# Patient Record
Sex: MALE | Race: BLACK OR AFRICAN AMERICAN
[De-identification: names, ages, dates, MRNs, and addresses within clinical notes are randomized per-mention and may not be internally consistent; named-entity substitution may affect disease eponyms.]

---

## 2009-02-09 VITALS — SYSTOLIC BLOOD PRESSURE: 162.6 MMHG

## 2017-01-26 ENCOUNTER — HOSPITAL ENCOUNTER (OUTPATIENT)
Dept: HOSPITAL 19 - COL.PUL | Age: 82
End: 2017-01-26
Attending: PHYSICIAN ASSISTANT
Payer: MEDICARE

## 2017-01-26 DIAGNOSIS — R06.02: Primary | ICD-10-CM

## 2017-01-26 LAB
ARTERIAL PATENCY WRIST A: (no result)
ARTERIAL PATENCY WRIST A: YES
ATS?: YES
BASE EXCESS BLDA CALC-SCNC: -0.7 MMOL/L (ref -2–2)
CO2 BLDA-SCNC: 23.4 MMOL/L
HCO3 BLDA-SCNC: 22.4 MEQ/L (ref 22–26)
INHALED O2 CONCENTRATION: 21 %
OXYHGB MFR BLD: 91.2 %
PH BLDA: 7.46 C (ref 7.35–7.45)
PO2 BLDA: 65.6 MMHG (ref 80–100)
PO2 BLDA: 65.6 MM[HG] (ref 80–100)
SAO2 % BLDA: 92.4 % (ref 92–100)

## 2017-02-22 ENCOUNTER — HOSPITAL ENCOUNTER (OUTPATIENT)
Dept: HOSPITAL 19 - COL.LAB | Age: 82
End: 2017-02-22
Attending: INTERNAL MEDICINE
Payer: MEDICARE

## 2017-02-22 DIAGNOSIS — I72.8: Primary | ICD-10-CM

## 2017-02-22 LAB
ANION GAP SERPL CALC-SCNC: 10 MMOL/L (ref 7–16)
BASOPHILS # BLD: 0 10*3/UL (ref 0–0.2)
BASOPHILS NFR BLD AUTO: 0.4 % (ref 0–2)
BUN SERPL-MCNC: 33 MG/DL (ref 9–20)
CALCIUM SERPL-MCNC: 10.2 MG/DL (ref 8.4–10.2)
CHLORIDE SERPL-SCNC: 107 MMOL/L (ref 98–107)
CO2 SERPL-SCNC: 26 MMOL/L (ref 22–30)
CREAT SERPL-SCNC: 1.93 MG/DL (ref 0.66–1.25)
EOSINOPHIL # BLD: 0.1 10*3/UL (ref 0–0.7)
EOSINOPHIL NFR BLD: 2.4 % (ref 0–4)
ERYTHROCYTE [DISTWIDTH] IN BLOOD BY AUTOMATED COUNT: 13.8 % (ref 11.5–14.5)
GLUCOSE SERPL-MCNC: 92 MG/DL (ref 74–106)
GRANULOCYTES # BLD AUTO: 64.1 % (ref 42.2–75.2)
HCT VFR BLD AUTO: 37.5 % (ref 42–52)
HGB BLD-MCNC: 11.8 G/DL (ref 13.5–18)
LYMPHOCYTES # BLD: 1.1 10*3/UL (ref 1.2–3.4)
LYMPHOCYTES NFR BLD: 21.4 % (ref 20–51)
MCH RBC QN AUTO: 32 PG (ref 27–31)
MCHC RBC AUTO-ENTMCNC: 32 G/DL (ref 33–37)
MCV RBC AUTO: 101 FL (ref 80–100)
MONOCYTES # BLD: 0.6 10*3/UL (ref 0.1–0.6)
MONOCYTES NFR BLD AUTO: 11.5 % (ref 1.7–9.3)
NEUTROPHILS # BLD: 3.2 10*3/UL (ref 1.4–6.5)
PLATELET # BLD AUTO: 142 K/MM3 (ref 130–400)
PMV BLD AUTO: 11 FL (ref 7.4–10.4)
POTASSIUM SERPL-SCNC: 5.1 MMOL/L (ref 3.4–5)
RBC # BLD AUTO: 3.71 M/MM3 (ref 4.2–5.6)
SODIUM SERPL-SCNC: 143 MMOL/L (ref 137–145)
WBC # BLD AUTO: 5 K/MM3 (ref 4.8–10.8)

## 2018-08-17 ENCOUNTER — HOSPITAL ENCOUNTER (OUTPATIENT)
Dept: HOSPITAL 19 - COL.VAS | Age: 83
End: 2018-08-17
Attending: INTERNAL MEDICINE
Payer: MEDICARE

## 2018-08-17 DIAGNOSIS — N17.9: ICD-10-CM

## 2018-08-17 DIAGNOSIS — N18.3: Primary | ICD-10-CM

## 2018-08-17 PROCEDURE — G0365 VESSEL MAPPING HEMO ACCESS: HCPCS

## 2019-12-26 ENCOUNTER — HOSPITAL ENCOUNTER (INPATIENT)
Dept: HOSPITAL 19 - MEDICAL | Age: 84
LOS: 4 days | Discharge: HOME | DRG: 682 | End: 2019-12-30
Attending: INTERNAL MEDICINE | Admitting: INTERNAL MEDICINE
Payer: MEDICARE

## 2019-12-26 VITALS — HEIGHT: 65.98 IN | WEIGHT: 116.62 LBS | BODY MASS INDEX: 18.74 KG/M2

## 2019-12-26 VITALS — TEMPERATURE: 97.5 F | DIASTOLIC BLOOD PRESSURE: 71 MMHG | HEART RATE: 74 BPM | SYSTOLIC BLOOD PRESSURE: 149 MMHG

## 2019-12-26 VITALS — DIASTOLIC BLOOD PRESSURE: 76 MMHG | HEART RATE: 81 BPM | SYSTOLIC BLOOD PRESSURE: 146 MMHG | TEMPERATURE: 98 F

## 2019-12-26 DIAGNOSIS — D63.1: ICD-10-CM

## 2019-12-26 DIAGNOSIS — I13.0: ICD-10-CM

## 2019-12-26 DIAGNOSIS — N18.4: ICD-10-CM

## 2019-12-26 DIAGNOSIS — Z87.891: ICD-10-CM

## 2019-12-26 DIAGNOSIS — I73.9: ICD-10-CM

## 2019-12-26 DIAGNOSIS — I50.9: ICD-10-CM

## 2019-12-26 DIAGNOSIS — Z95.0: ICD-10-CM

## 2019-12-26 DIAGNOSIS — N17.9: Primary | ICD-10-CM

## 2019-12-26 DIAGNOSIS — G89.29: ICD-10-CM

## 2019-12-26 DIAGNOSIS — E43: ICD-10-CM

## 2019-12-26 DIAGNOSIS — K52.9: ICD-10-CM

## 2019-12-26 DIAGNOSIS — Z87.01: ICD-10-CM

## 2019-12-26 DIAGNOSIS — Z79.82: ICD-10-CM

## 2019-12-26 DIAGNOSIS — Z79.891: ICD-10-CM

## 2019-12-26 DIAGNOSIS — Z87.440: ICD-10-CM

## 2019-12-26 DIAGNOSIS — F32.9: ICD-10-CM

## 2019-12-26 DIAGNOSIS — I27.20: ICD-10-CM

## 2019-12-26 LAB
HYALINE CASTS #/AREA URNS LPF: (no result) /LPF
PH UR STRIP.AUTO: 5 [PH] (ref 5–8)
RBC # UR: (no result) /HPF
SP GR UR STRIP.AUTO: 1.01 (ref 1–1.03)
SQUAMOUS # URNS: (no result) /HPF
UA DIPSTICK PNL UR STRIP.AUTO: (no result)
URN COLLECT METHOD CLASS: (no result)
WBC # UR: (no result) /HPF

## 2019-12-26 NOTE — NUR
Patient to room 352 by wheelchair. A&Ox3, reporting pain in legs. No pain
medication requested. Nursing staff oriented patient to room ,call light and
bed. VSS. Fistula right forearm. Wife at the bedside. No further needs
expressed from patient. Call light within reach. Will call Dr Melendez for
additional orders.

## 2019-12-26 NOTE — NUR
Patient resting in bed, wife at the bedside. A&Ox3, reporting pain in legs. No
pain medication requested. VSS. IV CDI, fluids infusing. PAtient complaints of
being cold, warm blankets provided when requested. No further needs expressed
from patient. Call light within reach

## 2019-12-27 VITALS — SYSTOLIC BLOOD PRESSURE: 125 MMHG | HEART RATE: 72 BPM | TEMPERATURE: 97.6 F | DIASTOLIC BLOOD PRESSURE: 61 MMHG

## 2019-12-27 VITALS — SYSTOLIC BLOOD PRESSURE: 108 MMHG | DIASTOLIC BLOOD PRESSURE: 62 MMHG | HEART RATE: 77 BPM | TEMPERATURE: 97.4 F

## 2019-12-27 VITALS — TEMPERATURE: 98.3 F | HEART RATE: 74 BPM | SYSTOLIC BLOOD PRESSURE: 116 MMHG | DIASTOLIC BLOOD PRESSURE: 63 MMHG

## 2019-12-27 VITALS — SYSTOLIC BLOOD PRESSURE: 117 MMHG | DIASTOLIC BLOOD PRESSURE: 59 MMHG | TEMPERATURE: 97.8 F | HEART RATE: 72 BPM

## 2019-12-27 VITALS — TEMPERATURE: 97.4 F | HEART RATE: 78 BPM | SYSTOLIC BLOOD PRESSURE: 121 MMHG | DIASTOLIC BLOOD PRESSURE: 54 MMHG

## 2019-12-27 VITALS — SYSTOLIC BLOOD PRESSURE: 122 MMHG | DIASTOLIC BLOOD PRESSURE: 60 MMHG | HEART RATE: 64 BPM | TEMPERATURE: 97.7 F

## 2019-12-27 VITALS — DIASTOLIC BLOOD PRESSURE: 60 MMHG | TEMPERATURE: 97.5 F | SYSTOLIC BLOOD PRESSURE: 126 MMHG | HEART RATE: 62 BPM

## 2019-12-27 LAB
ALBUMIN SERPL-MCNC: 3.2 GM/DL (ref 3.5–5)
ALP SERPL-CCNC: 48 U/L (ref 50–136)
ALT SERPL-CCNC: 14 U/L (ref 21–72)
ANION GAP SERPL CALC-SCNC: 9 MMOL/L (ref 7–16)
AST SERPL-CCNC: 13 U/L (ref 15–37)
BASOPHILS # BLD: 0 10*3/UL (ref 0–0.2)
BASOPHILS NFR BLD AUTO: 0.2 % (ref 0–2)
BILIRUB SERPL-MCNC: 0.4 MG/DL (ref 0–1)
BUN SERPL-MCNC: 112 MG/DL (ref 9–20)
CALCIUM SERPL-MCNC: 8.4 MG/DL (ref 8.4–10.2)
CHLORIDE SERPL-SCNC: 111 MMOL/L (ref 98–107)
CO2 SERPL-SCNC: 17 MMOL/L (ref 22–30)
CREAT SERPL-SCNC: 5.1 UMOL/L (ref 0.66–1.25)
EOSINOPHIL # BLD: 0.2 10*3/UL (ref 0–0.7)
EOSINOPHIL NFR BLD: 3.5 % (ref 0–4)
ERYTHROCYTE [DISTWIDTH] IN BLOOD BY AUTOMATED COUNT: 12.5 % (ref 11.5–14.5)
GLUCOSE SERPL-MCNC: 89 MG/DL (ref 74–106)
GRANULOCYTES # BLD AUTO: 72 % (ref 42.2–75.2)
HCT VFR BLD AUTO: 23.9 % (ref 42–52)
HGB BLD-MCNC: 7.2 G/DL (ref 13.5–18)
INR BLD: 1.2 (ref 0.8–3)
LYMPHOCYTES # BLD: 0.6 10*3/UL (ref 1.2–3.4)
LYMPHOCYTES NFR BLD: 12.6 % (ref 20–51)
MCH RBC QN AUTO: 32 PG (ref 27–31)
MCHC RBC AUTO-ENTMCNC: 30 G/DL (ref 33–37)
MCV RBC AUTO: 105 FL (ref 80–100)
MONOCYTES # BLD: 0.5 10*3/UL (ref 0.1–0.6)
MONOCYTES NFR BLD AUTO: 11.5 % (ref 1.7–9.3)
NEUTROPHILS # BLD: 3.3 10*3/UL (ref 1.4–6.5)
PLATELET # BLD AUTO: 65 K/MM3 (ref 130–400)
PMV BLD AUTO: 11.5 FL (ref 7.4–10.4)
POTASSIUM SERPL-SCNC: 5.3 MMOL/L (ref 3.4–5)
PROT SERPL-MCNC: 6.1 GM/DL (ref 6.4–8.2)
PROTHROMBIN TIME: 14 SECONDS (ref 9.7–12.8)
RBC # BLD AUTO: 2.28 M/MM3 (ref 4.2–5.6)
SODIUM SERPL-SCNC: 137 MMOL/L (ref 137–145)

## 2019-12-27 NOTE — NUR
QUITA met with the patient and his wife, Monico (ph#[phone]), to discuss
discharge plan. The patient lives in [place] with his wife. He reports
needing some assistance with bathing and has a cane and walker. He states that
his wife helps him with bathing. The patient's PCP is Dr. Noah Rodriguez and he
receives his medications at the  on [address]. He reports no difficulties
obtaining his meds. The patient does not have advanced directives in EMR, but
he states that he does have them completed. He states that his DPOA-HC is his
wife. The patient plans to return home with his wife upon discharge. No
additional needs at this time.

## 2019-12-27 NOTE — NUR
Pt awake and alert this morning, family in room, no C/O pain at this time,
shift assessments complete, left Pt call light in reCH, BED IN LOWEST
POSITION.

## 2019-12-27 NOTE — NUR
Pt resting in the room today, no C/O pain, sat up in the recliner for about 2
hours during the day, VS have remained stable.

## 2019-12-27 NOTE — NUR
Pt medication adminstered at this time. Delay was due to non verification of
medication X2. Clarification required for dosing required for both medication
which was obtained via pt and pts spouse. Following some time after adjustment
to medication rec, E pharmacy was informed. Verification in medication on EMAR
received. BP appropriate for dosing, medication administered.

## 2019-12-28 VITALS — SYSTOLIC BLOOD PRESSURE: 131 MMHG | TEMPERATURE: 98 F | DIASTOLIC BLOOD PRESSURE: 72 MMHG | HEART RATE: 68 BPM

## 2019-12-28 VITALS — DIASTOLIC BLOOD PRESSURE: 59 MMHG | TEMPERATURE: 98.2 F | HEART RATE: 70 BPM | SYSTOLIC BLOOD PRESSURE: 110 MMHG

## 2019-12-28 VITALS — TEMPERATURE: 98.4 F | HEART RATE: 69 BPM | SYSTOLIC BLOOD PRESSURE: 144 MMHG | DIASTOLIC BLOOD PRESSURE: 72 MMHG

## 2019-12-28 VITALS — HEART RATE: 53 BPM | DIASTOLIC BLOOD PRESSURE: 56 MMHG | TEMPERATURE: 98 F | SYSTOLIC BLOOD PRESSURE: 111 MMHG

## 2019-12-28 VITALS — SYSTOLIC BLOOD PRESSURE: 152 MMHG | TEMPERATURE: 97.9 F | DIASTOLIC BLOOD PRESSURE: 77 MMHG | HEART RATE: 69 BPM

## 2019-12-28 VITALS — DIASTOLIC BLOOD PRESSURE: 54 MMHG | TEMPERATURE: 97.7 F | HEART RATE: 66 BPM | SYSTOLIC BLOOD PRESSURE: 134 MMHG

## 2019-12-28 LAB
ALBUMIN SERPL-MCNC: 3.6 GM/DL (ref 3.5–5)
ANION GAP SERPL CALC-SCNC: 8 MMOL/L (ref 7–16)
BASOPHILS # BLD: 0 10*3/UL (ref 0–0.2)
BASOPHILS NFR BLD AUTO: 0.2 % (ref 0–2)
BUN SERPL-MCNC: 107 MG/DL (ref 9–20)
CALCIUM SERPL-MCNC: 9.4 MG/DL (ref 8.4–10.2)
CHLORIDE SERPL-SCNC: 112 MMOL/L (ref 98–107)
CO2 SERPL-SCNC: 22 MMOL/L (ref 22–30)
CREAT SERPL-SCNC: 4.67 UMOL/L (ref 0.66–1.25)
EOSINOPHIL # BLD: 0.2 10*3/UL (ref 0–0.7)
EOSINOPHIL NFR BLD: 3.9 % (ref 0–4)
ERYTHROCYTE [DISTWIDTH] IN BLOOD BY AUTOMATED COUNT: 12.6 % (ref 11.5–14.5)
GLUCOSE SERPL-MCNC: 100 MG/DL (ref 74–106)
GRANULOCYTES # BLD AUTO: 68.9 % (ref 42.2–75.2)
HCT VFR BLD AUTO: 25.8 % (ref 42–52)
HGB BLD-MCNC: 8.1 G/DL (ref 13.5–18)
LYMPHOCYTES # BLD: 0.6 10*3/UL (ref 1.2–3.4)
LYMPHOCYTES NFR BLD: 13.3 % (ref 20–51)
MCH RBC QN AUTO: 32 PG (ref 27–31)
MCHC RBC AUTO-ENTMCNC: 31 G/DL (ref 33–37)
MCV RBC AUTO: 103 FL (ref 80–100)
MONOCYTES # BLD: 0.6 10*3/UL (ref 0.1–0.6)
MONOCYTES NFR BLD AUTO: 12.6 % (ref 1.7–9.3)
NEUTROPHILS # BLD: 3.2 10*3/UL (ref 1.4–6.5)
PHOSPHATE SERPL-MCNC: 4.5 MG/DL (ref 2.5–4.5)
PLATELET # BLD AUTO: 76 K/MM3 (ref 130–400)
PMV BLD AUTO: 10.7 FL (ref 7.4–10.4)
POTASSIUM SERPL-SCNC: 5 MMOL/L (ref 3.4–5)
RBC # BLD AUTO: 2.5 M/MM3 (ref 4.2–5.6)
SODIUM SERPL-SCNC: 142 MMOL/L (ref 137–145)

## 2019-12-28 NOTE — NUR
Lying in bed with eyes closed. Respirations even and unlabored. No signs or
symptoms of discomfort noted at this time.

## 2019-12-28 NOTE — NUR
Patient voided 75mL dark clear yellow urine. Denies feeling like he still
needs to urinate or feeling like bladder is full. Bladder scan completed,
results with zero. Patient denies needs at this time.

## 2019-12-28 NOTE — NUR
Lying in bed on right side with eyes closed. Opens eyes when name called out.
Denies pain. Has had two loose bowel movements in the past hour and would like
medication to help stop the loose stools. Patient denies further needs at this
time.

## 2019-12-28 NOTE — NUR
PATIENT ASSESSMENT COMPLETED. HE REPORTS THAT HE HAS HAD NO MORE DIARRHEA
SINCE TAKING THE IMMODIUM. HE DENIES ANY PAIN OR NAUSEA.

## 2019-12-28 NOTE — NUR
Patient resting in bed. Denies pain. Has had 2 loose stools. VSS. IV CDI.
Fistula CDI. Caregiver at the bedside. No further needs expressed from
patient. Call light within reach

## 2019-12-29 VITALS — SYSTOLIC BLOOD PRESSURE: 132 MMHG | TEMPERATURE: 98 F | HEART RATE: 70 BPM | DIASTOLIC BLOOD PRESSURE: 49 MMHG

## 2019-12-29 VITALS — HEART RATE: 77 BPM | SYSTOLIC BLOOD PRESSURE: 153 MMHG | DIASTOLIC BLOOD PRESSURE: 79 MMHG

## 2019-12-29 VITALS — SYSTOLIC BLOOD PRESSURE: 130 MMHG | HEART RATE: 68 BPM | DIASTOLIC BLOOD PRESSURE: 64 MMHG | TEMPERATURE: 98.2 F

## 2019-12-29 VITALS — TEMPERATURE: 97.9 F | SYSTOLIC BLOOD PRESSURE: 115 MMHG | DIASTOLIC BLOOD PRESSURE: 63 MMHG | HEART RATE: 65 BPM

## 2019-12-29 VITALS — SYSTOLIC BLOOD PRESSURE: 105 MMHG | HEART RATE: 69 BPM | TEMPERATURE: 98.2 F | DIASTOLIC BLOOD PRESSURE: 57 MMHG

## 2019-12-29 VITALS — DIASTOLIC BLOOD PRESSURE: 59 MMHG | SYSTOLIC BLOOD PRESSURE: 123 MMHG | TEMPERATURE: 97.6 F | HEART RATE: 67 BPM

## 2019-12-29 LAB
ALBUMIN SERPL-MCNC: 3.6 GM/DL (ref 3.5–5)
ANION GAP SERPL CALC-SCNC: 8 MMOL/L (ref 7–16)
BUN SERPL-MCNC: 99 MG/DL (ref 9–20)
CALCIUM SERPL-MCNC: 9.2 MG/DL (ref 8.4–10.2)
CHLORIDE SERPL-SCNC: 112 MMOL/L (ref 98–107)
CO2 SERPL-SCNC: 21 MMOL/L (ref 22–30)
CREAT SERPL-SCNC: 4.31 UMOL/L (ref 0.66–1.25)
GLUCOSE SERPL-MCNC: 122 MG/DL (ref 74–106)
PHOSPHATE SERPL-MCNC: 4.2 MG/DL (ref 2.5–4.5)
POTASSIUM SERPL-SCNC: 5 MMOL/L (ref 3.4–5)
SODIUM SERPL-SCNC: 141 MMOL/L (ref 137–145)

## 2019-12-29 NOTE — NUR
Lying in bed with eyes closed. Respirations even and unlabored. No signs or
symptoms of discomfort noted.

## 2019-12-29 NOTE — NUR
Lying in bed with eyes open. Just urinated 50mL clear yellow urine. Bladder
scan done at this time and 1mL noted in bladder. Patient denies feeling
fullness in bladder or urge to urinate. Denies pain or further needs at this
time.

## 2019-12-29 NOTE — NUR
Lying in bed with eyes closed. Eyes open when name called out. Denies pain or
any discomfort. Denies any needs or concerns at this time.

## 2019-12-29 NOTE — NUR
Lying in bed with eyes closed. Eyes open when name called out. Denies pain or
any loose stools. Significant other at bedside. IV infusing without
difficulty. Patient denies any needs or concerns at this time.

## 2019-12-29 NOTE — NUR
I TRIED TO GET HORACE TO PUT ON HIS SCD'S AND HE TELLS ME THAT HE DOESN'T WANT
THEM ON RIGHT NOW MAYBE LATER.

## 2019-12-30 VITALS — HEART RATE: 59 BPM | SYSTOLIC BLOOD PRESSURE: 135 MMHG | TEMPERATURE: 98.2 F | DIASTOLIC BLOOD PRESSURE: 74 MMHG

## 2019-12-30 VITALS — HEART RATE: 62 BPM | TEMPERATURE: 98.1 F | DIASTOLIC BLOOD PRESSURE: 60 MMHG | SYSTOLIC BLOOD PRESSURE: 125 MMHG

## 2019-12-30 VITALS — HEART RATE: 66 BPM | DIASTOLIC BLOOD PRESSURE: 52 MMHG | TEMPERATURE: 97.8 F | SYSTOLIC BLOOD PRESSURE: 122 MMHG

## 2019-12-30 LAB
ALBUMIN SERPL-MCNC: 3.5 GM/DL (ref 3.5–5)
ANION GAP SERPL CALC-SCNC: 8 MMOL/L (ref 7–16)
BASOPHILS # BLD: 0 10*3/UL (ref 0–0.2)
BASOPHILS NFR BLD AUTO: 0.5 % (ref 0–2)
BUN SERPL-MCNC: 94 MG/DL (ref 9–20)
CALCIUM SERPL-MCNC: 8.8 MG/DL (ref 8.4–10.2)
CHLORIDE SERPL-SCNC: 115 MMOL/L (ref 98–107)
CO2 SERPL-SCNC: 18 MMOL/L (ref 22–30)
CREAT SERPL-SCNC: 4 UMOL/L (ref 0.66–1.25)
EOSINOPHIL # BLD: 0.2 10*3/UL (ref 0–0.7)
EOSINOPHIL NFR BLD: 4.1 % (ref 0–4)
ERYTHROCYTE [DISTWIDTH] IN BLOOD BY AUTOMATED COUNT: 12.9 % (ref 11.5–14.5)
GLUCOSE SERPL-MCNC: 89 MG/DL (ref 74–106)
GRANULOCYTES # BLD AUTO: 67.2 % (ref 42.2–75.2)
HCT VFR BLD AUTO: 25.4 % (ref 42–52)
HGB BLD-MCNC: 8.1 G/DL (ref 13.5–18)
LYMPHOCYTES # BLD: 0.6 10*3/UL (ref 1.2–3.4)
LYMPHOCYTES NFR BLD: 13.2 % (ref 20–51)
MCH RBC QN AUTO: 33 PG (ref 27–31)
MCHC RBC AUTO-ENTMCNC: 32 G/DL (ref 33–37)
MCV RBC AUTO: 103 FL (ref 80–100)
MONOCYTES # BLD: 0.7 10*3/UL (ref 0.1–0.6)
MONOCYTES NFR BLD AUTO: 14.8 % (ref 1.7–9.3)
NEUTROPHILS # BLD: 3 10*3/UL (ref 1.4–6.5)
PHOSPHATE SERPL-MCNC: 4.3 MG/DL (ref 2.5–4.5)
PLATELET # BLD AUTO: 76 K/MM3 (ref 130–400)
PMV BLD AUTO: 11.1 FL (ref 7.4–10.4)
POTASSIUM SERPL-SCNC: 5.6 MMOL/L (ref 3.4–5)
RBC # BLD AUTO: 2.47 M/MM3 (ref 4.2–5.6)
SODIUM SERPL-SCNC: 140 MMOL/L (ref 137–145)

## 2019-12-30 NOTE — NUR
The patient is to discharge back home with his wife today, 12/30. SW met with
the patient and his wife to present and explain the IM form. The patient was
sleeping. The patient's wife verbalized understanding, signed, and she was
provided a copy. No additional needs at this time.

## 2019-12-30 NOTE — NUR
Discharge teaching completed at this time. pt received discharge packet, INT
dc'd, tip intact. Pt wants to eat lunch and then will leave via w/c with all
bleongings after lunch. Criteria met.

## 2019-12-30 NOTE — NUR
Lying on left side in bed with eyes closed. Bipap on. Respirations  even and
unlabored. No signs or symptoms of discomfort noted.

## 2019-12-30 NOTE — NUR
Report received from JENNIFER Erickson. Pt in bed resting with wife at bedside,
sleeping, will continue to monitor.

## 2019-12-30 NOTE — NUR
Assessment charted. Pt feeling well, anticipating discharge today. INT to LFA,
RFA A/V fistula. Up to shower, will continue to monitor.

## 2019-12-30 NOTE — NUR
Lying supine in bed with eyes closed. Respirations even and unlabored. No
signs or symptoms of discomfort noted at this time.

## 2020-08-07 ENCOUNTER — HOSPITAL ENCOUNTER (EMERGENCY)
Dept: HOSPITAL 19 - COL.ER | Age: 85
Discharge: HOME | End: 2020-08-07
Payer: MEDICARE

## 2020-08-07 VITALS — DIASTOLIC BLOOD PRESSURE: 101 MMHG | HEART RATE: 73 BPM | SYSTOLIC BLOOD PRESSURE: 165 MMHG

## 2020-08-07 VITALS — TEMPERATURE: 98.9 F

## 2020-08-07 VITALS — BODY MASS INDEX: 22.01 KG/M2 | HEIGHT: 67.01 IN | WEIGHT: 140.21 LBS

## 2020-08-07 DIAGNOSIS — I50.9: ICD-10-CM

## 2020-08-07 DIAGNOSIS — J44.9: ICD-10-CM

## 2020-08-07 DIAGNOSIS — Z99.2: ICD-10-CM

## 2020-08-07 DIAGNOSIS — I12.0: ICD-10-CM

## 2020-08-07 DIAGNOSIS — N18.6: Primary | ICD-10-CM

## 2020-08-07 LAB
ALBUMIN SERPL-MCNC: 4.1 GM/DL (ref 3.5–5)
ALP SERPL-CCNC: 70 U/L (ref 50–136)
ALT SERPL-CCNC: 9 U/L (ref 4–49)
ANION GAP SERPL CALC-SCNC: 6 MMOL/L (ref 7–16)
AST SERPL-CCNC: 21 U/L (ref 15–37)
BASOPHILS # BLD: 0.1 10*3/UL (ref 0–0.2)
BASOPHILS NFR BLD AUTO: 1 % (ref 0–2)
BILIRUB SERPL-MCNC: 0.7 MG/DL (ref 0–1)
BUN SERPL-MCNC: 22 MG/DL (ref 9–20)
CALCIUM SERPL-MCNC: 9.3 MG/DL (ref 8.4–10.2)
CHLORIDE SERPL-SCNC: 102 MMOL/L (ref 98–107)
CO2 SERPL-SCNC: 30 MMOL/L (ref 22–30)
CREAT SERPL-SCNC: 1.97 UMOL/L (ref 0.66–1.25)
EOSINOPHIL # BLD: 0.2 10*3/UL (ref 0–0.7)
EOSINOPHIL NFR BLD: 4.5 % (ref 0–4)
ERYTHROCYTE [DISTWIDTH] IN BLOOD BY AUTOMATED COUNT: 13.9 % (ref 11.5–14.5)
GLUCOSE SERPL-MCNC: 74 MG/DL (ref 74–106)
GRANULOCYTES # BLD AUTO: 58.6 % (ref 42.2–75.2)
HCT VFR BLD AUTO: 40.6 % (ref 42–52)
HGB BLD-MCNC: 12.2 G/DL (ref 13.5–18)
LYMPHOCYTES # BLD: 0.8 10*3/UL (ref 1.2–3.4)
LYMPHOCYTES NFR BLD: 15.5 % (ref 20–51)
MAGNESIUM SERPL-MCNC: 1.9 MG/DL (ref 1.6–2.3)
MCH RBC QN AUTO: 32 PG (ref 27–31)
MCHC RBC AUTO-ENTMCNC: 30 G/DL (ref 33–37)
MCV RBC AUTO: 108 FL (ref 80–100)
MONOCYTES # BLD: 1 10*3/UL (ref 0.1–0.6)
MONOCYTES NFR BLD AUTO: 20 % (ref 1.7–9.3)
NEUTROPHILS # BLD: 2.8 10*3/UL (ref 1.4–6.5)
PHOSPHATE SERPL-MCNC: 2.9 MG/DL (ref 2.5–4.5)
PLATELET # BLD AUTO: 123 K/MM3 (ref 130–400)
PMV BLD AUTO: 10 FL (ref 7.4–10.4)
POTASSIUM SERPL-SCNC: 3.9 MMOL/L (ref 3.4–5)
PROT SERPL-MCNC: 8.1 GM/DL (ref 6.4–8.2)
RBC # BLD AUTO: 3.76 M/MM3 (ref 4.2–5.6)
SODIUM SERPL-SCNC: 138 MMOL/L (ref 137–145)
TROPONIN I SERPL-MCNC: 0.04 NG/ML (ref 0–0.04)

## 2020-09-15 ENCOUNTER — HOSPITAL ENCOUNTER (OUTPATIENT)
Dept: HOSPITAL 19 - COL.CAR | Age: 85
Discharge: HOME | End: 2020-09-15
Attending: INTERNAL MEDICINE
Payer: MEDICARE

## 2020-09-15 VITALS — SYSTOLIC BLOOD PRESSURE: 134 MMHG | TEMPERATURE: 98 F | DIASTOLIC BLOOD PRESSURE: 92 MMHG | HEART RATE: 78 BPM

## 2020-09-15 VITALS — TEMPERATURE: 98 F | SYSTOLIC BLOOD PRESSURE: 162 MMHG | HEART RATE: 107 BPM | DIASTOLIC BLOOD PRESSURE: 103 MMHG

## 2020-09-15 VITALS — SYSTOLIC BLOOD PRESSURE: 151 MMHG | HEART RATE: 73 BPM | DIASTOLIC BLOOD PRESSURE: 100 MMHG

## 2020-09-15 VITALS — HEART RATE: 88 BPM | DIASTOLIC BLOOD PRESSURE: 89 MMHG | SYSTOLIC BLOOD PRESSURE: 142 MMHG | TEMPERATURE: 98 F

## 2020-09-15 VITALS — BODY MASS INDEX: 18.72 KG/M2 | WEIGHT: 119.27 LBS | HEIGHT: 67 IN

## 2020-09-15 VITALS — SYSTOLIC BLOOD PRESSURE: 154 MMHG | HEART RATE: 69 BPM | TEMPERATURE: 98 F | DIASTOLIC BLOOD PRESSURE: 98 MMHG

## 2020-09-15 VITALS — SYSTOLIC BLOOD PRESSURE: 139 MMHG | TEMPERATURE: 98 F | DIASTOLIC BLOOD PRESSURE: 111 MMHG | HEART RATE: 83 BPM

## 2020-09-15 VITALS — HEART RATE: 72 BPM | SYSTOLIC BLOOD PRESSURE: 160 MMHG | TEMPERATURE: 98 F | DIASTOLIC BLOOD PRESSURE: 98 MMHG

## 2020-09-15 VITALS — DIASTOLIC BLOOD PRESSURE: 90 MMHG | TEMPERATURE: 98 F | SYSTOLIC BLOOD PRESSURE: 130 MMHG | HEART RATE: 74 BPM

## 2020-09-15 DIAGNOSIS — F17.210: ICD-10-CM

## 2020-09-15 DIAGNOSIS — N18.6: ICD-10-CM

## 2020-09-15 DIAGNOSIS — Z96.641: ICD-10-CM

## 2020-09-15 DIAGNOSIS — T82.838A: Primary | ICD-10-CM

## 2020-09-15 PROCEDURE — C1894 INTRO/SHEATH, NON-LASER: HCPCS

## 2020-09-15 NOTE — NUR
SEE MERGE DOCUMENTATION FOR  MEDICATION ADMINISTRATION TIMES AND INTRA/POST
PROCEDURE SEDATION ASSESSMENTS.

## 2020-09-15 NOTE — NUR
INT discontinued intact after Dr. Issa talked with pt. Discharge instructions
given. Pt spouse taking him to bathroom

## 2020-12-10 ENCOUNTER — HOSPITAL ENCOUNTER (OUTPATIENT)
Dept: HOSPITAL 19 - COL.RAD | Age: 85
End: 2020-12-10
Payer: MEDICARE

## 2020-12-10 DIAGNOSIS — I72.4: Primary | ICD-10-CM

## 2021-03-22 ENCOUNTER — HOSPITAL ENCOUNTER (OUTPATIENT)
Dept: HOSPITAL 19 - COL.ER | Age: 86
Setting detail: OBSERVATION
LOS: 2 days | Discharge: HOME | End: 2021-03-24
Attending: INTERNAL MEDICINE | Admitting: INTERNAL MEDICINE
Payer: MEDICARE

## 2021-03-22 VITALS — DIASTOLIC BLOOD PRESSURE: 70 MMHG | SYSTOLIC BLOOD PRESSURE: 133 MMHG | HEART RATE: 70 BPM | TEMPERATURE: 98.2 F

## 2021-03-22 VITALS — TEMPERATURE: 98 F | SYSTOLIC BLOOD PRESSURE: 120 MMHG | DIASTOLIC BLOOD PRESSURE: 74 MMHG | HEART RATE: 77 BPM

## 2021-03-22 VITALS — WEIGHT: 131.4 LBS | HEIGHT: 67.99 IN | BODY MASS INDEX: 19.91 KG/M2

## 2021-03-22 VITALS — DIASTOLIC BLOOD PRESSURE: 81 MMHG | SYSTOLIC BLOOD PRESSURE: 151 MMHG | TEMPERATURE: 97.8 F | HEART RATE: 81 BPM

## 2021-03-22 DIAGNOSIS — R41.82: Primary | ICD-10-CM

## 2021-03-22 DIAGNOSIS — I48.91: ICD-10-CM

## 2021-03-22 DIAGNOSIS — I27.20: ICD-10-CM

## 2021-03-22 DIAGNOSIS — I73.9: ICD-10-CM

## 2021-03-22 DIAGNOSIS — I16.0: ICD-10-CM

## 2021-03-22 DIAGNOSIS — I50.23: ICD-10-CM

## 2021-03-22 DIAGNOSIS — Z96.659: ICD-10-CM

## 2021-03-22 DIAGNOSIS — J44.9: ICD-10-CM

## 2021-03-22 DIAGNOSIS — I48.0: ICD-10-CM

## 2021-03-22 DIAGNOSIS — F32.9: ICD-10-CM

## 2021-03-22 DIAGNOSIS — Z87.891: ICD-10-CM

## 2021-03-22 DIAGNOSIS — J96.11: ICD-10-CM

## 2021-03-22 DIAGNOSIS — Z79.899: ICD-10-CM

## 2021-03-22 DIAGNOSIS — Z99.2: ICD-10-CM

## 2021-03-22 DIAGNOSIS — I44.0: ICD-10-CM

## 2021-03-22 DIAGNOSIS — N18.6: ICD-10-CM

## 2021-03-22 DIAGNOSIS — Z86.79: ICD-10-CM

## 2021-03-22 DIAGNOSIS — I13.2: ICD-10-CM

## 2021-03-22 DIAGNOSIS — Z96.641: ICD-10-CM

## 2021-03-22 DIAGNOSIS — G89.29: ICD-10-CM

## 2021-03-22 DIAGNOSIS — D63.1: ICD-10-CM

## 2021-03-22 DIAGNOSIS — Z79.891: ICD-10-CM

## 2021-03-22 LAB
ALBUMIN SERPL-MCNC: 4.1 GM/DL (ref 3.5–5)
ALP SERPL-CCNC: 72 U/L (ref 50–136)
ALT SERPL-CCNC: 8 U/L (ref 4–49)
ANION GAP SERPL CALC-SCNC: 9 MMOL/L (ref 7–16)
AST SERPL-CCNC: 23 U/L (ref 15–37)
BASOPHILS # BLD: 0 10*3/UL (ref 0–0.2)
BASOPHILS NFR BLD AUTO: 0.4 % (ref 0–2)
BILIRUB SERPL-MCNC: 0.6 MG/DL (ref 0–1)
BUN SERPL-MCNC: 36 MG/DL (ref 9–20)
CALCIUM SERPL-MCNC: 9.2 MG/DL (ref 8.4–10.2)
CHLORIDE SERPL-SCNC: 102 MMOL/L (ref 98–107)
CO2 SERPL-SCNC: 26 MMOL/L (ref 22–30)
CREAT SERPL-SCNC: 5.39 UMOL/L (ref 0.66–1.25)
EOSINOPHIL # BLD: 0.3 10*3/UL (ref 0–0.7)
EOSINOPHIL NFR BLD: 5.2 % (ref 0–4)
ERYTHROCYTE [DISTWIDTH] IN BLOOD BY AUTOMATED COUNT: 12.7 % (ref 11.5–14.5)
GLUCOSE SERPL-MCNC: 103 MG/DL (ref 74–106)
GRANULOCYTES # BLD AUTO: 71.4 % (ref 42.2–75.2)
HCT VFR BLD AUTO: 31.9 % (ref 42–52)
HGB BLD-MCNC: 10.1 G/DL (ref 13.5–18)
INR BLD: 1.1 (ref 0.8–3)
LYMPHOCYTES # BLD: 0.6 10*3/UL (ref 1.2–3.4)
LYMPHOCYTES NFR BLD: 11.6 % (ref 20–51)
MCH RBC QN AUTO: 35 PG (ref 27–31)
MCHC RBC AUTO-ENTMCNC: 32 G/DL (ref 33–37)
MCV RBC AUTO: 112 FL (ref 80–100)
MONOCYTES # BLD: 0.6 10*3/UL (ref 0.1–0.6)
MONOCYTES NFR BLD AUTO: 11.2 % (ref 1.7–9.3)
NEUTROPHILS # BLD: 3.6 10*3/UL (ref 1.4–6.5)
PLATELET # BLD AUTO: 131 K/MM3 (ref 130–400)
PMV BLD AUTO: 10.5 FL (ref 7.4–10.4)
POTASSIUM SERPL-SCNC: 4.1 MMOL/L (ref 3.4–5)
PROT SERPL-MCNC: 7.9 GM/DL (ref 6.4–8.2)
PROTHROMBIN TIME: 12.5 SECONDS (ref 9.7–12.8)
RBC # BLD AUTO: 2.85 M/MM3 (ref 4.2–5.6)
SODIUM SERPL-SCNC: 137 MMOL/L (ref 137–145)
TROPONIN I SERPL-MCNC: 0.07 NG/ML (ref 0–0.04)

## 2021-03-22 PROCEDURE — G0378 HOSPITAL OBSERVATION PER HR: HCPCS

## 2021-03-22 NOTE — NUR
PT BROUGHT TO MEDICAL FLOOR AROUND 1530. ASSESSMENT COMPLETED. PT IS SEEN BY
JENNIFER OCOLEY WITH DR. LOPEZ.

## 2021-03-22 NOTE — NUR
PT LAYING IN BED AT THIS TIME. THERE ARE NO COMPLAINTS. PT IS SLEEPING, AND
WIFE IS AT BEDSIDE. FALL PRECAUTIONS ARE IN PLACE, AND BED ALARM IS ON. WALKER
HAS BEEN PLACED IN HIS ROOM FOR AMBULATION. PATIENT IS AWARE OF THE NEED TO
CALL IF HE'D LIKE TO GET UP TO THE BATHROOM. WILL REPORT TO NIGHT SHIFT.

## 2021-03-22 NOTE — NUR
PATIENT WAS RECEIVED FAIR IN BED ON O2 THERAPY.DUE MEDS GIVEN .DENIES PAIN.NO
OTHER NEEDS AT THIS TIME.

## 2021-03-23 VITALS — TEMPERATURE: 98.7 F | DIASTOLIC BLOOD PRESSURE: 77 MMHG | SYSTOLIC BLOOD PRESSURE: 129 MMHG | HEART RATE: 75 BPM

## 2021-03-23 VITALS — TEMPERATURE: 98.2 F | DIASTOLIC BLOOD PRESSURE: 65 MMHG | HEART RATE: 74 BPM | SYSTOLIC BLOOD PRESSURE: 101 MMHG

## 2021-03-23 VITALS — DIASTOLIC BLOOD PRESSURE: 67 MMHG | TEMPERATURE: 98.3 F | SYSTOLIC BLOOD PRESSURE: 113 MMHG | HEART RATE: 75 BPM

## 2021-03-23 VITALS — SYSTOLIC BLOOD PRESSURE: 120 MMHG | TEMPERATURE: 98.8 F | DIASTOLIC BLOOD PRESSURE: 62 MMHG | HEART RATE: 86 BPM

## 2021-03-23 VITALS — SYSTOLIC BLOOD PRESSURE: 129 MMHG | TEMPERATURE: 97.6 F | HEART RATE: 72 BPM | DIASTOLIC BLOOD PRESSURE: 83 MMHG

## 2021-03-23 VITALS — TEMPERATURE: 98.5 F | HEART RATE: 78 BPM | SYSTOLIC BLOOD PRESSURE: 110 MMHG | DIASTOLIC BLOOD PRESSURE: 46 MMHG

## 2021-03-23 LAB
ALBUMIN SERPL-MCNC: 3.7 GM/DL (ref 3.5–5)
ANION GAP SERPL CALC-SCNC: 11 MMOL/L (ref 7–16)
BASOPHILS # BLD: 0 10*3/UL (ref 0–0.2)
BASOPHILS NFR BLD AUTO: 0.6 % (ref 0–2)
BUN SERPL-MCNC: 50 MG/DL (ref 9–20)
CALCIUM SERPL-MCNC: 9.1 MG/DL (ref 8.4–10.2)
CHLORIDE SERPL-SCNC: 105 MMOL/L (ref 98–107)
CO2 SERPL-SCNC: 26 MMOL/L (ref 22–30)
CREAT SERPL-SCNC: 7.62 UMOL/L (ref 0.66–1.25)
EOSINOPHIL # BLD: 0.3 10*3/UL (ref 0–0.7)
EOSINOPHIL NFR BLD: 5.7 % (ref 0–4)
ERYTHROCYTE [DISTWIDTH] IN BLOOD BY AUTOMATED COUNT: 12.7 % (ref 11.5–14.5)
GLUCOSE SERPL-MCNC: 92 MG/DL (ref 74–106)
GRANULOCYTES # BLD AUTO: 64.8 % (ref 42.2–75.2)
HCT VFR BLD AUTO: 30.3 % (ref 42–52)
HGB BLD-MCNC: 9.6 G/DL (ref 13.5–18)
LYMPHOCYTES # BLD: 0.8 10*3/UL (ref 1.2–3.4)
LYMPHOCYTES NFR BLD: 15.3 % (ref 20–51)
MCH RBC QN AUTO: 36 PG (ref 27–31)
MCHC RBC AUTO-ENTMCNC: 32 G/DL (ref 33–37)
MCV RBC AUTO: 114 FL (ref 80–100)
MONOCYTES # BLD: 0.7 10*3/UL (ref 0.1–0.6)
MONOCYTES NFR BLD AUTO: 13.4 % (ref 1.7–9.3)
NEUTROPHILS # BLD: 3.3 10*3/UL (ref 1.4–6.5)
PHOSPHATE SERPL-MCNC: 4.3 MG/DL (ref 2.5–4.5)
PLATELET # BLD AUTO: 103 K/MM3 (ref 130–400)
PMV BLD AUTO: 10.2 FL (ref 7.4–10.4)
POTASSIUM SERPL-SCNC: 4.3 MMOL/L (ref 3.4–5)
RBC # BLD AUTO: 2.65 M/MM3 (ref 4.2–5.6)
SODIUM SERPL-SCNC: 141 MMOL/L (ref 137–145)

## 2021-03-23 NOTE — NUR
Patient awake. Assesment done. Patient denies pain. He is on O2 at 2lpm via
NC. He denies needs at this time. Call light within reach. Instructed to call
for help if he needs to go to the bathroom.

## 2021-03-23 NOTE — NUR
met with patient and patient's wife, Monico (ph#[phone]) to
discuss discharge planning. Patient lives in [place] with his wife and
sees Dr. Noah Rodriguez for primary care. Patient obtains medications from [facility] with no difficulties. Patient has a walker, wheelchair, and home oxygen.
[facility] Patient reports his wife,
Monico helps him with dressing and bathing. Patient reports he had home health
in the past but patient and his wife advised they are not interested in HH
services at this time. Patient reports his wife, Monico is DPOA-HC. PT/OT have
been ordered for patient. Patient declined to work with PT today and told PT
that he walks fine with his walker. Patient plans to return home upon
discharge.

## 2021-03-23 NOTE — NUR
PATIENT HAD A CALM NIGHT,ON O2 VIA NC 4L.HE IS FOR DIALYSIS TODAY.DENIES
PAIN.NO DUE MEDS AT THIS TIME.

## 2021-03-23 NOTE — NUR
ATTEMPTED TO WALK ON RA BUT PERFUSION IN FINGER IS POOR UNABLE TO GET A SPO2
UNTIL AFTER SITTING ON SIDE OF BED FOR SEVERAL MINUTES

## 2021-03-23 NOTE — NUR
PATIENT TAKEN TO DIALYSIS VIA WHEELCHAIR BY DIALYSIS NURSE. WILL WAIT FOR
PATIENT ARRIVAL BACK TO ROOM 307.

## 2021-03-23 NOTE — NUR
Patient tolerated HD tx today, removed 1L of fluid during a 2 hr tx. Next HD
tx planned for tomorrow, Wednesday 3/24/21 @ 0800.

## 2021-03-24 VITALS — SYSTOLIC BLOOD PRESSURE: 128 MMHG | TEMPERATURE: 97.6 F | HEART RATE: 67 BPM | DIASTOLIC BLOOD PRESSURE: 70 MMHG

## 2021-03-24 VITALS — HEART RATE: 65 BPM | TEMPERATURE: 97.5 F | SYSTOLIC BLOOD PRESSURE: 137 MMHG | DIASTOLIC BLOOD PRESSURE: 75 MMHG

## 2021-03-24 VITALS — SYSTOLIC BLOOD PRESSURE: 136 MMHG | HEART RATE: 78 BPM | DIASTOLIC BLOOD PRESSURE: 71 MMHG | TEMPERATURE: 98.4 F

## 2021-03-24 VITALS — DIASTOLIC BLOOD PRESSURE: 78 MMHG | TEMPERATURE: 98.1 F | HEART RATE: 72 BPM | SYSTOLIC BLOOD PRESSURE: 158 MMHG

## 2021-03-24 LAB
ALBUMIN SERPL-MCNC: 3.7 GM/DL (ref 3.5–5)
ANION GAP SERPL CALC-SCNC: 10 MMOL/L (ref 7–16)
BASOPHILS # BLD: 0 10*3/UL (ref 0–0.2)
BASOPHILS NFR BLD AUTO: 0.6 % (ref 0–2)
BUN SERPL-MCNC: 39 MG/DL (ref 9–20)
CALCIUM SERPL-MCNC: 9.1 MG/DL (ref 8.4–10.2)
CHLORIDE SERPL-SCNC: 105 MMOL/L (ref 98–107)
CO2 SERPL-SCNC: 26 MMOL/L (ref 22–30)
CREAT SERPL-SCNC: 6.51 UMOL/L (ref 0.66–1.25)
EOSINOPHIL # BLD: 0.3 10*3/UL (ref 0–0.7)
EOSINOPHIL NFR BLD: 6.6 % (ref 0–4)
ERYTHROCYTE [DISTWIDTH] IN BLOOD BY AUTOMATED COUNT: 12.7 % (ref 11.5–14.5)
GLUCOSE SERPL-MCNC: 84 MG/DL (ref 74–106)
GRANULOCYTES # BLD AUTO: 64.8 % (ref 42.2–75.2)
HCT VFR BLD AUTO: 30.9 % (ref 42–52)
HGB BLD-MCNC: 9.7 G/DL (ref 13.5–18)
LYMPHOCYTES # BLD: 0.7 10*3/UL (ref 1.2–3.4)
LYMPHOCYTES NFR BLD: 14.3 % (ref 20–51)
MCH RBC QN AUTO: 36 PG (ref 27–31)
MCHC RBC AUTO-ENTMCNC: 31 G/DL (ref 33–37)
MCV RBC AUTO: 116 FL (ref 80–100)
MONOCYTES # BLD: 0.7 10*3/UL (ref 0.1–0.6)
MONOCYTES NFR BLD AUTO: 13.5 % (ref 1.7–9.3)
NEUTROPHILS # BLD: 3.2 10*3/UL (ref 1.4–6.5)
PHOSPHATE SERPL-MCNC: 3.5 MG/DL (ref 2.5–4.5)
PLATELET # BLD AUTO: 102 K/MM3 (ref 130–400)
PMV BLD AUTO: 10.6 FL (ref 7.4–10.4)
POTASSIUM SERPL-SCNC: 4.2 MMOL/L (ref 3.4–5)
RBC # BLD AUTO: 2.67 M/MM3 (ref 4.2–5.6)
SODIUM SERPL-SCNC: 141 MMOL/L (ref 137–145)

## 2021-03-24 NOTE — NUR
Patient had uneventful night. He was asleep most of the night. He does call if
he needs to go to the bathroom. He denies pain.

## 2021-03-24 NOTE — NUR
Assessment complete. Patient resting in bed watching TV. Telemetry on and
rhythm regular/paced. O2 on at 2L, lung sounds clear all fields, with
breathing unlabored and SpO2 at 95%. Patient has no complaints of Shortness of
Breath at this time. INT in LUE no redness or swelling. AV fistula to right
arm has thrill and bruit present. Right internal jugular cathether CDI with
tegaderm dressing. Patient denies pain at this time.

## 2021-03-24 NOTE — NUR
PT HAS HAD UNEVENTFUL MORNING. PT IS WANTING TO GO HOME, DR. COSTA HAS TOLD
THE PATIENT HE CAN GO HOME, DR. LOPEZ WILL EVALUATE THE ECHO, AND THEN MAKE A
DECISION. OTHER THAN THAT, THERE ARE NO CONCERNS. PATIENT HAS REMAINED ON O2
AT 2L.

## 2021-03-24 NOTE — NUR
collaborated with Dr. Melendez about discharge planning, as
patient will discharge home today. Patient has a oxygen concentrator at home
for night time oxygen, but patient does not have contiuous oxygen set up and
will need it for discharge. Patient qualifies for 3 liters, continuous. QUITA
collaborated with Ramona Lora, [facility] and Anant Carias from [facility] about oxygen order. QUITA faxed referral and order to fax#[phone].
Anant advised oxygen order has been sent to their [facility] provider and oxygen
will be delivered around 1730. QUITA met with patient and patient's wife to
advise that the [facility] will [facility] to the [facility] and follow up to set up
portable system at home as patient has dialysis on Friday. QUITA collaborated the
above information to RNDenise. No additional needs at this time.

## 2021-05-03 ENCOUNTER — HOSPITAL ENCOUNTER (INPATIENT)
Dept: HOSPITAL 19 - COL.ER | Age: 86
LOS: 3 days | Discharge: HOME | DRG: 640 | End: 2021-05-06
Attending: INTERNAL MEDICINE | Admitting: INTERNAL MEDICINE
Payer: MEDICARE

## 2021-05-03 VITALS — BODY MASS INDEX: 21.51 KG/M2 | WEIGHT: 133.82 LBS | HEIGHT: 65.98 IN

## 2021-05-03 VITALS — TEMPERATURE: 97.9 F | SYSTOLIC BLOOD PRESSURE: 100 MMHG | DIASTOLIC BLOOD PRESSURE: 65 MMHG | HEART RATE: 93 BPM

## 2021-05-03 VITALS — DIASTOLIC BLOOD PRESSURE: 66 MMHG | TEMPERATURE: 98.3 F | HEART RATE: 68 BPM | SYSTOLIC BLOOD PRESSURE: 120 MMHG

## 2021-05-03 DIAGNOSIS — I12.0: ICD-10-CM

## 2021-05-03 DIAGNOSIS — D63.1: ICD-10-CM

## 2021-05-03 DIAGNOSIS — N18.6: ICD-10-CM

## 2021-05-03 DIAGNOSIS — R00.1: ICD-10-CM

## 2021-05-03 DIAGNOSIS — E46: ICD-10-CM

## 2021-05-03 DIAGNOSIS — E87.70: Primary | ICD-10-CM

## 2021-05-03 DIAGNOSIS — R09.02: ICD-10-CM

## 2021-05-03 DIAGNOSIS — I27.20: ICD-10-CM

## 2021-05-03 LAB
ALBUMIN SERPL-MCNC: 3.7 GM/DL (ref 3.5–5)
ALP SERPL-CCNC: 106 U/L (ref 50–136)
ALT SERPL-CCNC: 8 U/L (ref 4–49)
ANION GAP SERPL CALC-SCNC: 7 MMOL/L (ref 7–16)
AST SERPL-CCNC: 27 U/L (ref 15–37)
BASOPHILS # BLD: 0.1 10*3/UL (ref 0–0.2)
BASOPHILS NFR BLD AUTO: 0.8 % (ref 0–2)
BILIRUB SERPL-MCNC: 0.6 MG/DL (ref 0–1)
BUN SERPL-MCNC: 25 MG/DL (ref 9–20)
CALCIUM SERPL-MCNC: 8.8 MG/DL (ref 8.4–10.2)
CHLORIDE SERPL-SCNC: 103 MMOL/L (ref 98–107)
CO2 SERPL-SCNC: 28 MMOL/L (ref 22–30)
CREAT SERPL-SCNC: 3.89 UMOL/L (ref 0.66–1.25)
EOSINOPHIL # BLD: 0.2 10*3/UL (ref 0–0.7)
EOSINOPHIL NFR BLD: 3.5 % (ref 0–4)
ERYTHROCYTE [DISTWIDTH] IN BLOOD BY AUTOMATED COUNT: 23.8 % (ref 11.5–14.5)
GLUCOSE SERPL-MCNC: 106 MG/DL (ref 74–106)
GRANULOCYTES # BLD AUTO: 74.7 % (ref 42.2–75.2)
HCT VFR BLD AUTO: 24.6 % (ref 42–52)
HGB BLD-MCNC: 7.3 G/DL (ref 13.5–18)
LYMPHOCYTES # BLD: 0.4 10*3/UL (ref 1.2–3.4)
LYMPHOCYTES NFR BLD: 6.1 % (ref 20–51)
MCH RBC QN AUTO: 34 PG (ref 27–31)
MCHC RBC AUTO-ENTMCNC: 30 G/DL (ref 33–37)
MCV RBC AUTO: 113 FL (ref 80–100)
MONOCYTES # BLD: 0.9 10*3/UL (ref 0.1–0.6)
MONOCYTES NFR BLD AUTO: 14.3 % (ref 1.7–9.3)
NEUTROPHILS # BLD: 4.9 10*3/UL (ref 1.4–6.5)
PLATELET # BLD AUTO: 197 K/MM3 (ref 130–400)
PMV BLD AUTO: 10.4 FL (ref 7.4–10.4)
POTASSIUM SERPL-SCNC: 3.7 MMOL/L (ref 3.4–5)
PROT SERPL-MCNC: 7.5 GM/DL (ref 6.4–8.2)
RBC # BLD AUTO: 2.17 M/MM3 (ref 4.2–5.6)
SODIUM SERPL-SCNC: 138 MMOL/L (ref 137–145)
TROPONIN I SERPL-MCNC: 0.07 NG/ML (ref 0–0.04)

## 2021-05-03 PROCEDURE — P9016 RBC LEUKOCYTES REDUCED: HCPCS

## 2021-05-03 NOTE — NUR
Report received, assumed care for night shift at 1900. Assessment complete. VS
stable. A&Ox3. Denies shortness of breath/nausea. O2@3L/NC with sats in the
high 90s. Rating pain 8/10 to right upper extremity-described as constant
throbbing with intermittent sharpness. Norco given per dr order. Right upper
extremity edematous with erythema. Elevated on pillows. Right chest dialysis
cath dressing CDI. Plan of care discussed for this shift to include HS
meds/pain control/elvating extremity/repositioning/calling for
questions/concerns. Verbalizes understanding/denies needs. Call light in
reach. Will monitor.

## 2021-05-03 NOTE — NUR
Pt arrived to medical unit room 314 from ED at this time via stretcher.
Oriented pt to room. Admission assessments and med rec completed. 
notified of pt's arrival. Pt on 4 lpm NC upon arrival, sats 100% and states
wears 3 lpm at home, O2 decreased to 3 lpm. Heart RRR. A&Ox4. 3+ pitting edema
to bilateral feet. Entire right arm edematous and skin tight, skin cool to
touch. Pt reports pain 7/10 and has difficult time w/right hand . Arm
elevated on pillow. Fistula to right upper arm w/palpable thrill and audible
bruit. Right chest dialysis cath in place, dressing CDI. IV to left forearm
w/o s/s complication. Call light in reach. Continuing to monitor.

## 2021-05-04 VITALS — DIASTOLIC BLOOD PRESSURE: 70 MMHG | HEART RATE: 98 BPM | TEMPERATURE: 98.6 F | SYSTOLIC BLOOD PRESSURE: 141 MMHG

## 2021-05-04 VITALS — SYSTOLIC BLOOD PRESSURE: 115 MMHG | HEART RATE: 87 BPM | DIASTOLIC BLOOD PRESSURE: 67 MMHG | TEMPERATURE: 98.4 F

## 2021-05-04 VITALS — TEMPERATURE: 98.4 F | SYSTOLIC BLOOD PRESSURE: 121 MMHG | DIASTOLIC BLOOD PRESSURE: 77 MMHG | HEART RATE: 52 BPM

## 2021-05-04 VITALS — TEMPERATURE: 97.4 F | DIASTOLIC BLOOD PRESSURE: 64 MMHG | SYSTOLIC BLOOD PRESSURE: 118 MMHG | HEART RATE: 49 BPM

## 2021-05-04 VITALS — DIASTOLIC BLOOD PRESSURE: 80 MMHG | SYSTOLIC BLOOD PRESSURE: 140 MMHG | HEART RATE: 93 BPM | TEMPERATURE: 98.4 F

## 2021-05-04 VITALS — HEART RATE: 80 BPM | SYSTOLIC BLOOD PRESSURE: 96 MMHG | DIASTOLIC BLOOD PRESSURE: 66 MMHG | TEMPERATURE: 98 F

## 2021-05-04 VITALS — TEMPERATURE: 98.4 F | DIASTOLIC BLOOD PRESSURE: 66 MMHG | HEART RATE: 89 BPM | SYSTOLIC BLOOD PRESSURE: 119 MMHG

## 2021-05-04 LAB
ALBUMIN SERPL-MCNC: 3.3 GM/DL (ref 3.5–5)
ANION GAP SERPL CALC-SCNC: 7 MMOL/L (ref 7–16)
BASOPHILS # BLD: 0 10*3/UL (ref 0–0.2)
BASOPHILS NFR BLD AUTO: 0.5 % (ref 0–2)
BUN SERPL-MCNC: 39 MG/DL (ref 9–20)
CALCIUM SERPL-MCNC: 9 MG/DL (ref 8.4–10.2)
CHLORIDE SERPL-SCNC: 106 MMOL/L (ref 98–107)
CO2 SERPL-SCNC: 27 MMOL/L (ref 22–30)
CREAT SERPL-SCNC: 6.19 UMOL/L (ref 0.66–1.25)
EOSINOPHIL # BLD: 0.3 10*3/UL (ref 0–0.7)
EOSINOPHIL NFR BLD: 5.3 % (ref 0–4)
ERYTHROCYTE [DISTWIDTH] IN BLOOD BY AUTOMATED COUNT: 23.8 % (ref 11.5–14.5)
GLUCOSE SERPL-MCNC: 103 MG/DL (ref 74–106)
GRANULOCYTES # BLD AUTO: 65.3 % (ref 42.2–75.2)
HCT VFR BLD AUTO: 22.7 % (ref 42–52)
HGB BLD-MCNC: 6.9 G/DL (ref 13.5–18)
LYMPHOCYTES # BLD: 0.8 10*3/UL (ref 1.2–3.4)
LYMPHOCYTES NFR BLD: 14.9 % (ref 20–51)
MCH RBC QN AUTO: 35 PG (ref 27–31)
MCHC RBC AUTO-ENTMCNC: 30 G/DL (ref 33–37)
MCV RBC AUTO: 114 FL (ref 80–100)
MONOCYTES # BLD: 0.8 10*3/UL (ref 0.1–0.6)
MONOCYTES NFR BLD AUTO: 13.8 % (ref 1.7–9.3)
NEUTROPHILS # BLD: 3.6 10*3/UL (ref 1.4–6.5)
PHOSPHATE SERPL-MCNC: 2.2 MG/DL (ref 2.5–4.5)
PLATELET # BLD AUTO: 168 K/MM3 (ref 130–400)
PMV BLD AUTO: 10.1 FL (ref 7.4–10.4)
POTASSIUM SERPL-SCNC: 4.2 MMOL/L (ref 3.4–5)
RBC # BLD AUTO: 2 M/MM3 (ref 4.2–5.6)
SODIUM SERPL-SCNC: 140 MMOL/L (ref 137–145)

## 2021-05-04 NOTE — NUR
Assessment completed, alert/oriented, vital signs stable, right arm is still
very swollen and tender to touch/manipulation or movment, he denies pain
otherwise, RUE fistula + bruit/thrill, using IJ central line for dialysis for
now, heart RRR, lungs CTA, abd soft and nontender, BS +, he has eaten
breakfast, denies other needs, and I have just taken him to dialysis by bed

## 2021-05-04 NOTE — NUR
met with patient and patient's wife, Monico (ph#[phone]) to
discuss discharge planning. Patient lives in [place] with his wife and
sees Dr. Rodriguez for primary care. Patient obtains medications from [facility]
and [facility]. Patient also has a walker and wheelchair
at home. Patient reports he receives some assistance with ADLS from his wife,
Monico. Patient also advised he has home health services, however he cannot
remember the agency. Patient advised he has DPOA-HC which designates his wife.
Patient plans to return home upon discharge with continued home health. SW
contacted [facility] and [facility], both do not serve
patient. SW also left a message from Kayley at [facility] and will
continue to follow up.
 
Discharge Plan: Home with continued home health.

## 2021-05-04 NOTE — NUR
Rating pain 8/10 to right upper extremity-described as constant
throbbing-norco given per dr guajardo.

## 2021-05-04 NOTE — NUR
Patient tolerated HD tx with 1.6L fluid removal. Next planned tx tomorrow
5/5/21 @ 8705 @ [facility].

## 2021-05-05 VITALS — HEART RATE: 90 BPM | SYSTOLIC BLOOD PRESSURE: 111 MMHG | TEMPERATURE: 98.2 F | DIASTOLIC BLOOD PRESSURE: 65 MMHG

## 2021-05-05 VITALS — TEMPERATURE: 98.8 F | DIASTOLIC BLOOD PRESSURE: 63 MMHG | HEART RATE: 65 BPM | SYSTOLIC BLOOD PRESSURE: 126 MMHG

## 2021-05-05 VITALS — SYSTOLIC BLOOD PRESSURE: 118 MMHG | DIASTOLIC BLOOD PRESSURE: 63 MMHG | HEART RATE: 93 BPM | TEMPERATURE: 98.6 F

## 2021-05-05 VITALS — SYSTOLIC BLOOD PRESSURE: 147 MMHG | DIASTOLIC BLOOD PRESSURE: 57 MMHG | TEMPERATURE: 98.8 F | HEART RATE: 108 BPM

## 2021-05-05 VITALS — TEMPERATURE: 98.5 F | HEART RATE: 100 BPM | SYSTOLIC BLOOD PRESSURE: 119 MMHG | DIASTOLIC BLOOD PRESSURE: 64 MMHG

## 2021-05-05 LAB
ALBUMIN SERPL-MCNC: 3.3 GM/DL (ref 3.5–5)
ANION GAP SERPL CALC-SCNC: 6 MMOL/L (ref 7–16)
BASOPHILS # BLD: 0 10*3/UL (ref 0–0.2)
BASOPHILS NFR BLD AUTO: 0.5 % (ref 0–2)
BUN SERPL-MCNC: 35 MG/DL (ref 9–20)
CALCIUM SERPL-MCNC: 8.9 MG/DL (ref 8.4–10.2)
CHLORIDE SERPL-SCNC: 105 MMOL/L (ref 98–107)
CO2 SERPL-SCNC: 28 MMOL/L (ref 22–30)
CREAT SERPL-SCNC: 4.95 UMOL/L (ref 0.66–1.25)
EOSINOPHIL # BLD: 0.2 10*3/UL (ref 0–0.7)
EOSINOPHIL NFR BLD: 3.2 % (ref 0–4)
ERYTHROCYTE [DISTWIDTH] IN BLOOD BY AUTOMATED COUNT: 22.8 % (ref 11.5–14.5)
GLUCOSE SERPL-MCNC: 123 MG/DL (ref 74–106)
GRANULOCYTES # BLD AUTO: 71.6 % (ref 42.2–75.2)
HCT VFR BLD AUTO: 22.6 % (ref 42–52)
HGB BLD-MCNC: 6.9 G/DL (ref 13.5–18)
LYMPHOCYTES # BLD: 0.6 10*3/UL (ref 1.2–3.4)
LYMPHOCYTES NFR BLD: 10.4 % (ref 20–51)
MCH RBC QN AUTO: 34 PG (ref 27–31)
MCHC RBC AUTO-ENTMCNC: 31 G/DL (ref 33–37)
MCV RBC AUTO: 112 FL (ref 80–100)
MONOCYTES # BLD: 0.8 10*3/UL (ref 0.1–0.6)
MONOCYTES NFR BLD AUTO: 13.8 % (ref 1.7–9.3)
NEUTROPHILS # BLD: 4.2 10*3/UL (ref 1.4–6.5)
PHOSPHATE SERPL-MCNC: 1.7 MG/DL (ref 2.5–4.5)
PLATELET # BLD AUTO: 187 K/MM3 (ref 130–400)
PMV BLD AUTO: 10.3 FL (ref 7.4–10.4)
POTASSIUM SERPL-SCNC: 4.1 MMOL/L (ref 3.4–5)
RBC # BLD AUTO: 2.01 M/MM3 (ref 4.2–5.6)
SODIUM SERPL-SCNC: 139 MMOL/L (ref 137–145)

## 2021-05-05 NOTE — NUR
EVER SINCE AM PT HAS BEEN DENYING PAIN, PT VIVIENNE PAIN IS RELIEVED EASIER WITH
HEATING PAD, ARM ELEVATED FOR WHOLE DAY, BLOOD NOW AVAILABLE FOR
ADMINISTRATION, CALL LIGHT WITHIN REACH, NO OTHER NEEDS AT THIS TIME.

## 2021-05-05 NOTE — NUR
PT. HAD UNEVENTFUL NIGHT. SLEPT THROUGHOUT NIGHT. VSS. 02 ROOM AIR. PT
CURRENTLY EXPRESSES NO NEEDS AT THIS TIME. CALL LIGHT WITHIN REACH.

## 2021-05-05 NOTE — NUR
VIVIENNE FIRM AND TENDER. PT DENYING PAIN AT THIS TIME, PT CURIOUS ABOUT BLOOD
TRANSFUSION AND DIALYSIS, TOLD HIM I WOULD LET HIM KNOW WHEN I GOT MORE
INFORMATION. PT TOOK MEDICATIONS, ASSESSMENT PERFORMED, MEDICATIONS GIVEN,
COREG HELD FOR POTENTIAL DIALYSIS. PT ATE SOME OF BREAKFAST. NO
OTHER NEEDS AT THIS TIME.

## 2021-05-05 NOTE — NUR
contacted Kayley at [facility] who confirmed they
provide nursing and wound care services for patient. SW faxed clinical
updates and also updated JAMEL Beck that patient will need home health
orders at discharge.

## 2021-05-05 NOTE — NUR
PT DENYING PAIN AT THIS TIME. R ARM ELEVATED WITH HEAT PACK APPLIED. BLOOD
BANK SAID BLOOD SHOULD BE HERE IN AFTERNOON, ALEXA MCCULLOUGH AND HUDSON RODRIGUEZ
NOTIFIED.

## 2021-05-05 NOTE — NUR
Assessment complete. Patient is alert and oriented with complaints of right
arm pain, 8/10. PRN dilaudid administered. Right arm is still edematous and
elevated on pillow; upper fistula still has audible bruit and palpable thrill;
lower fistula is not accessible and has no thrill or bruit. No edema on lower
extremities is noted. HR is irregular with normal rhythm. Lung sounds are
clear over diminished. Bed alarm set and call light in reach. Will continue to
monitor.

## 2021-05-06 VITALS — DIASTOLIC BLOOD PRESSURE: 62 MMHG | TEMPERATURE: 99.1 F | HEART RATE: 97 BPM | SYSTOLIC BLOOD PRESSURE: 130 MMHG

## 2021-05-06 VITALS — HEART RATE: 102 BPM | SYSTOLIC BLOOD PRESSURE: 149 MMHG | DIASTOLIC BLOOD PRESSURE: 88 MMHG | TEMPERATURE: 98.8 F

## 2021-05-06 VITALS — DIASTOLIC BLOOD PRESSURE: 69 MMHG | HEART RATE: 101 BPM | TEMPERATURE: 98.7 F | SYSTOLIC BLOOD PRESSURE: 130 MMHG

## 2021-05-06 VITALS — HEART RATE: 100 BPM | SYSTOLIC BLOOD PRESSURE: 147 MMHG | TEMPERATURE: 98.6 F | DIASTOLIC BLOOD PRESSURE: 111 MMHG

## 2021-05-06 VITALS — DIASTOLIC BLOOD PRESSURE: 62 MMHG | SYSTOLIC BLOOD PRESSURE: 130 MMHG | TEMPERATURE: 99.1 F | HEART RATE: 95 BPM

## 2021-05-06 VITALS — TEMPERATURE: 98.8 F | HEART RATE: 98 BPM | DIASTOLIC BLOOD PRESSURE: 71 MMHG | SYSTOLIC BLOOD PRESSURE: 138 MMHG

## 2021-05-06 VITALS — TEMPERATURE: 98.2 F | HEART RATE: 101 BPM | SYSTOLIC BLOOD PRESSURE: 131 MMHG | DIASTOLIC BLOOD PRESSURE: 69 MMHG

## 2021-05-06 VITALS — DIASTOLIC BLOOD PRESSURE: 69 MMHG | SYSTOLIC BLOOD PRESSURE: 131 MMHG | HEART RATE: 101 BPM | TEMPERATURE: 98.2 F

## 2021-05-06 VITALS — DIASTOLIC BLOOD PRESSURE: 71 MMHG | HEART RATE: 98 BPM | SYSTOLIC BLOOD PRESSURE: 145 MMHG

## 2021-05-06 VITALS — DIASTOLIC BLOOD PRESSURE: 72 MMHG | SYSTOLIC BLOOD PRESSURE: 146 MMHG | TEMPERATURE: 98.1 F | HEART RATE: 95 BPM

## 2021-05-06 VITALS — DIASTOLIC BLOOD PRESSURE: 53 MMHG | HEART RATE: 76 BPM | SYSTOLIC BLOOD PRESSURE: 121 MMHG

## 2021-05-06 VITALS — HEART RATE: 93 BPM | DIASTOLIC BLOOD PRESSURE: 71 MMHG | SYSTOLIC BLOOD PRESSURE: 138 MMHG

## 2021-05-06 LAB
ALBUMIN SERPL-MCNC: 3.3 GM/DL (ref 3.5–5)
ANION GAP SERPL CALC-SCNC: 9 MMOL/L (ref 7–16)
BASOPHILS # BLD: 0 10*3/UL (ref 0–0.2)
BASOPHILS NFR BLD AUTO: 0.4 % (ref 0–2)
BUN SERPL-MCNC: 52 MG/DL (ref 9–20)
CALCIUM SERPL-MCNC: 8.9 MG/DL (ref 8.4–10.2)
CHLORIDE SERPL-SCNC: 105 MMOL/L (ref 98–107)
CO2 SERPL-SCNC: 26 MMOL/L (ref 22–30)
CREAT SERPL-SCNC: 6.89 UMOL/L (ref 0.66–1.25)
EOSINOPHIL # BLD: 0.3 10*3/UL (ref 0–0.7)
EOSINOPHIL NFR BLD: 3.4 % (ref 0–4)
ERYTHROCYTE [DISTWIDTH] IN BLOOD BY AUTOMATED COUNT: 24.5 % (ref 11.5–14.5)
GLUCOSE SERPL-MCNC: 120 MG/DL (ref 74–106)
GRANULOCYTES # BLD AUTO: 75.1 % (ref 42.2–75.2)
HCT VFR BLD AUTO: 28.5 % (ref 42–52)
HGB BLD-MCNC: 9.1 G/DL (ref 13.5–18)
LYMPHOCYTES # BLD: 0.6 10*3/UL (ref 1.2–3.4)
LYMPHOCYTES NFR BLD: 8.1 % (ref 20–51)
MCH RBC QN AUTO: 34 PG (ref 27–31)
MCHC RBC AUTO-ENTMCNC: 32 G/DL (ref 33–37)
MCV RBC AUTO: 106 FL (ref 80–100)
MONOCYTES # BLD: 0.9 10*3/UL (ref 0.1–0.6)
MONOCYTES NFR BLD AUTO: 12.7 % (ref 1.7–9.3)
NEUTROPHILS # BLD: 5.6 10*3/UL (ref 1.4–6.5)
PHOSPHATE SERPL-MCNC: 2.2 MG/DL (ref 2.5–4.5)
PLATELET # BLD AUTO: 177 K/MM3 (ref 130–400)
PMV BLD AUTO: 10.3 FL (ref 7.4–10.4)
POTASSIUM SERPL-SCNC: 4.4 MMOL/L (ref 3.4–5)
RBC # BLD AUTO: 2.7 M/MM3 (ref 4.2–5.6)
SODIUM SERPL-SCNC: 140 MMOL/L (ref 137–145)

## 2021-05-06 PROCEDURE — 5A1D70Z PERFORMANCE OF URINARY FILTRATION, INTERMITTENT, LESS THAN 6 HOURS PER DAY: ICD-10-PCS | Performed by: INTERNAL MEDICINE

## 2021-05-06 NOTE — NUR
PT IV REMOVED, DISCHARGE EDUCATION PROVIDED, NO OTHER QUESTIONS AT THIS TIME,
PT ESCORTED OUT VIA WHEELCHAIR.

## 2021-05-06 NOTE — NUR
1 UNIT PRBC TRANSFUSION INITIATED AT THIS TIME. PATIENT EDUCATED ON S/S
TRANSFUSION REACTION. REMAINED AT BEDSIDE FOR FRST 15 MINUTES OF TRANSFUSION.
VITALS STABLE. WILL CONTINUE TO MONITOR.

## 2021-05-06 NOTE — NUR
Patient tolerated HD tx with 1.7 L fluid removal. Next HD tx scheduled
tomorrow @ the clinic, Friday 5/7/2021.

## 2021-05-06 NOTE — NUR
2ND UNIT PRBC INITIATED AT THIS TIME. PATIENT TOLERATED FIRST TRANSFUSION WITH
NO COMPLICATIONS. REMAINED AT BEDSIDE FOR FIRST 15 MINUTES OF TRANSFUSION.
VITAL SIGNS STABLE. WILL CONTINUE TO MONITOR.

## 2021-05-06 NOTE — NUR
2nd transfusion of PRBC complete. Patient tolerated well with no adverse side
effects. Will continue to monitor.

## 2024-03-04 NOTE — NUR
Last lipid panel, 2022.-Total cholesterol 211, LDH    Plan   Repeat lipid panel and assess cardiovascular risk         PT REFUSING DIALYSIS DUE TO PAIN IN R ARM 10/10.  HUDSON RN ALERTED, NORCO
GIVEN TO PT. NORCO NOT RESULTING IN PAIN RELIEF, HOT PACK PLACED ON R ARM. PT
TEARFUL. CALLED DR LOPEZ ABOUT LACK OF PAIN RELIEF AND PT REFUSING DIALYSIS,
DILAUDID ORDERS GIVEN OVER THE PHONE AND ORDERS FOR LABS GIVEN, WILL HOLD
DIALYSIS UNTIL BLOOD IS READY FROM BLOOD BANK. WAITING ON BLOOD BANK TO RETURN
CALL TO SEE IF BLOOD WILL BE AVAILABLE TODAY OR TOMORROW. DILAUDID GIVEN TO PT
AND ARM REPOSITIONED.